# Patient Record
(demographics unavailable — no encounter records)

---

## 2025-02-10 NOTE — DISCUSSION/SUMMARY
[FreeTextEntry1] : Patient likely with viral pharyngitis. Rapid strep perfromed in office is negative. Will send throat culture to rule out strep. Recommend supportive care with antipyretics, salt water gargles, and if age-appropriate throat lozenges.

## 2025-02-10 NOTE — HISTORY OF PRESENT ILLNESS
[de-identified] : THROAT PAIN X 3 DAYS [FreeTextEntry6] : sore throat x3 days with slight congestion  no fevers good PO intake, UOP and BMs

## 2025-03-10 NOTE — DISCUSSION/SUMMARY
[FreeTextEntry1] : Rapid Strep: Negative Throat Culture sent to lab Treat symptoms with acetaminophen or ibuprofen as needed, encourage po fluids Discussed likely viral illness and expected course Call if no better in 3 days, sooner for change/concerns/worsening recheck prn Phone follow -up after throat culture back Will call if fever last >5 days, worsening or new symptoms  Discussed signs/symptoms that would require immediate care. Parent expressed understanding. RVP Sent out  Immunology Referral - As per mothers request. Frequent sicknesses.

## 2025-03-10 NOTE — HISTORY OF PRESENT ILLNESS
[de-identified] : WOKE UP WITH 103 F TEMP. YESTERDAY, SORE THROAT, CONGESTION FOR 2 DAYS; TOOK ADVIL AT 11:30 AM [FreeTextEntry6] : Nasal congestion, cough, fever x1 day.  tolerating po intake. normal uop.